# Patient Record
Sex: FEMALE | Race: BLACK OR AFRICAN AMERICAN | Employment: UNEMPLOYED | URBAN - METROPOLITAN AREA
[De-identification: names, ages, dates, MRNs, and addresses within clinical notes are randomized per-mention and may not be internally consistent; named-entity substitution may affect disease eponyms.]

---

## 2018-03-26 ENCOUNTER — HOSPITAL ENCOUNTER (OUTPATIENT)
Dept: VASCULAR SURGERY | Age: 68
Discharge: HOME | End: 2018-03-26
Attending: INTERNAL MEDICINE
Payer: MEDICAID

## 2018-03-26 DIAGNOSIS — R09.89 BRUIT: ICD-10-CM

## 2018-03-26 PROCEDURE — 93880 EXTRACRANIAL BILAT STUDY: CPT

## 2018-03-26 NOTE — PROCEDURES
[facility]  *** FINAL REPORT ***    Name: Malorie Tee  MRN: [id number]    Outpatient  : 05 Oct 1950  HIS Order #: [id number]  [id number] [facility] Visit #: [id number]  Date: 26 Mar 2018    TYPE OF TEST: Cerebrovascular Duplex    REASON FOR TEST  Carotid bruit (both hemispheres)    Right Carotid:-             Proximal               Mid                 Distal  cm/s  Systolic  Diastolic  Systolic  Diastolic  Systolic  Diastolic  CCA:     61.1      19.0       90.0      19.0       89.0      23.0  Bulb:  ECA:    301.0      17.0  ICA:    101.0      49.0       85.0      32.0       93.0      42.0  ICA/CCA:  1.0       2.6    ICA Stenosis: <50%    Right Vertebral:-  Finding: Antegrade  Sys:       84.0  Cathi:       22.0    Right Subclavian: Normal    Left Carotid:-            Proximal                Mid                 Distal  cm/s  Systolic  Diastolic  Systolic  Diastolic  Systolic  Diastolic  CCA:     95.4      12.0       80.0      13.0       79.0      14.0  Bulb:  ECA:    392.0      33.0  ICA:     71.0      17.0       64.0      16.0       51.0      15.0  ICA/CCA:  0.7       1.4    ICA Stenosis: <50%    Left Vertebral:-  Finding: Antegrade  Sys:       89.0  Cathi:       22.0    Left Subclavian: Normal    INTERPRETATION/FINDINGS  Duplex images were obtained using 2-D gray scale, color flow, and  spectral Doppler analysis. 1. Heterogenous varying density plaque in the bifurcations and  internal carotid arteries bilaterally with <50% stenosis. 2. Probable hemodynamically significant stenosis in the external  carotid arteries bilaterally; most likely the source of bruits. 3. Antegrade flow in both vertebral arteries. 4. Normal flow in both subclavian arteries. Plaque Morphology:  1. Irregular varying density plaque in the bulb and right ICA. 2. Irregular varying density plaque in the bulb and left ICA.     ADDITIONAL COMMENTS    I have personally reviewed the data relevant to the interpretation of  this  study. TECHNOLOGIST: Florin Cevallos. Fellowes, RTR, RVT  Signed: 03/26/2018 03:34 PM    PHYSICIAN: Gera Carbajal MD  Signed: 03/26/2018 04:26 PM